# Patient Record
Sex: MALE | Race: WHITE | Employment: STUDENT | URBAN - METROPOLITAN AREA
[De-identification: names, ages, dates, MRNs, and addresses within clinical notes are randomized per-mention and may not be internally consistent; named-entity substitution may affect disease eponyms.]

---
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
## 2019-05-08 ENCOUNTER — APPOINTMENT (OUTPATIENT)
Dept: GENERAL RADIOLOGY | Age: 19
End: 2019-05-08
Attending: NURSE PRACTITIONER
Payer: SELF-PAY

## 2019-05-08 ENCOUNTER — HOSPITAL ENCOUNTER (EMERGENCY)
Age: 19
Discharge: HOME | End: 2019-05-08
Attending: EMERGENCY MEDICINE
Payer: SELF-PAY

## 2019-05-08 VITALS
HEIGHT: 73 IN | BODY MASS INDEX: 29.16 KG/M2 | WEIGHT: 220 LBS | SYSTOLIC BLOOD PRESSURE: 129 MMHG | DIASTOLIC BLOOD PRESSURE: 78 MMHG | HEART RATE: 72 BPM | OXYGEN SATURATION: 98 % | TEMPERATURE: 97.9 F | RESPIRATION RATE: 18 BRPM

## 2019-05-08 DIAGNOSIS — S61.512A LACERATION OF LEFT WRIST, INITIAL ENCOUNTER: Primary | ICD-10-CM

## 2019-05-08 PROCEDURE — 90715 TDAP VACCINE 7 YRS/> IM: CPT | Performed by: NURSE PRACTITIONER

## 2019-05-08 PROCEDURE — 74011250636 HC RX REV CODE- 250/636

## 2019-05-08 PROCEDURE — 74011250636 HC RX REV CODE- 250/636: Performed by: NURSE PRACTITIONER

## 2019-05-08 PROCEDURE — 74011250637 HC RX REV CODE- 250/637: Performed by: NURSE PRACTITIONER

## 2019-05-08 PROCEDURE — 77030002916 HC SUT ETHLN J&J -A: Performed by: NURSE PRACTITIONER

## 2019-05-08 PROCEDURE — 99283 EMERGENCY DEPT VISIT LOW MDM: CPT | Performed by: NURSE PRACTITIONER

## 2019-05-08 PROCEDURE — 75810000293 HC SIMP/SUPERF WND  RPR: Performed by: NURSE PRACTITIONER

## 2019-05-08 PROCEDURE — 73110 X-RAY EXAM OF WRIST: CPT

## 2019-05-08 PROCEDURE — 90471 IMMUNIZATION ADMIN: CPT | Performed by: NURSE PRACTITIONER

## 2019-05-08 RX ORDER — LIDOCAINE HYDROCHLORIDE 10 MG/ML
INJECTION INFILTRATION; PERINEURAL
Status: COMPLETED
Start: 2019-05-08 | End: 2019-05-08

## 2019-05-08 RX ORDER — LIDOCAINE HYDROCHLORIDE 10 MG/ML
10 INJECTION INFILTRATION; PERINEURAL
Status: COMPLETED | OUTPATIENT
Start: 2019-05-08 | End: 2019-05-08

## 2019-05-08 RX ORDER — IBUPROFEN 800 MG/1
800 TABLET ORAL
Status: COMPLETED | OUTPATIENT
Start: 2019-05-08 | End: 2019-05-08

## 2019-05-08 RX ORDER — CEPHALEXIN 500 MG/1
500 CAPSULE ORAL 3 TIMES DAILY
Qty: 15 CAP | Refills: 0 | Status: SHIPPED | OUTPATIENT
Start: 2019-05-08 | End: 2019-05-13

## 2019-05-08 RX ADMIN — IBUPROFEN 800 MG: 800 TABLET, FILM COATED ORAL at 13:40

## 2019-05-08 RX ADMIN — LIDOCAINE HYDROCHLORIDE 10 ML: 10 INJECTION INFILTRATION; PERINEURAL at 13:00

## 2019-05-08 RX ADMIN — TETANUS TOXOID, REDUCED DIPHTHERIA TOXOID AND ACELLULAR PERTUSSIS VACCINE, ADSORBED 0.5 ML: 5; 2.5; 8; 8; 2.5 SUSPENSION INTRAMUSCULAR at 13:40

## 2019-05-08 RX ADMIN — LIDOCAINE HYDROCHLORIDE 10 ML: 10 INJECTION, SOLUTION INFILTRATION; PERINEURAL at 13:00

## 2019-05-08 NOTE — DISCHARGE INSTRUCTIONS
Keflex as prescribed  Antibiotic ointment to the area. Sutures out in 7 days. Return sooner for redness, drainage, fever, or any concerns you may have.

## 2019-05-08 NOTE — ED TRIAGE NOTES
Pt to ed with c/o laceration to the left wrist area - pt reports that his son was playing against a window trying to get a cat and he heard it crack and moved his child and the glass cut him - bleeding is controlled - dressing in place

## 2019-05-08 NOTE — ED NOTES
I have reviewed discharge instructions with the patient. The patient verbalized understanding. Patient left ED via Discharge Method: ambulatory to Home with (insert name of family/friend, self). Opportunity for questions and clarification provided. Patient given 1 scripts. To continue your aftercare when you leave the hospital, you may receive an automated call from our care team to check in on how you are doing. This is a free service and part of our promise to provide the best care and service to meet your aftercare needs.  If you have questions, or wish to unsubscribe from this service please call [phone]. Thank you for Choosing our [facility] Emergency Department.

## 2023-01-04 NOTE — ED PROVIDER NOTES
Patient presents with laceration to his left hand/wrist area. He states he cut his hand on a broken window. He states unknown last tetanus. He denies numbness and tingling to his fingers. The history is provided by the patient. Laceration The incident occurred 1 to 2 hours ago. The laceration is located on the left hand. The laceration is 8 cm in size. The injury mechanism is broken glass. Foreign body present: no. The pain is at a severity of 7/10. The pain is mild. The pain has been constant since onset. Pertinent negatives include no numbness, no tingling, no weakness, no coolness and no discoloration. It is unknown when the patient last had a tetanus shot. Past Medical History:  
Diagnosis Date  Hypertension History reviewed. No pertinent surgical history. Family History:  
Problem Relation Age of Onset  Thyroid Disease Mother   
     hyperthyroid Social History Socioeconomic History  Marital status: SINGLE Spouse name: Not on file  Number of children: Not on file  Years of education: Not on file  Highest education level: Not on file Occupational History  Not on file Social Needs  Financial resource strain: Not on file  Food insecurity:  
  Worry: Not on file Inability: Not on file  Transportation needs:  
  Medical: Not on file Non-medical: Not on file Tobacco Use  Smoking status: Never Smoker Substance and Sexual Activity  Alcohol use: No  
 Drug use: No  
 Sexual activity: Never Lifestyle  Physical activity:  
  Days per week: Not on file Minutes per session: Not on file  Stress: Not on file Relationships  Social connections:  
  Talks on phone: Not on file Gets together: Not on file Attends Latter-day service: Not on file Active member of club or organization: Not on file Attends meetings of clubs or organizations: Not on file Relationship status: Not on file  Intimate partner violence:  
  Fear of current or ex partner: Not on file Emotionally abused: Not on file Physically abused: Not on file Forced sexual activity: Not on file Other Topics Concern  Not on file Social History Narrative  Not on file ALLERGIES: Patient has no known allergies. Review of Systems Constitutional: Negative for chills and fever. Respiratory: Negative for cough and shortness of breath. Cardiovascular: Negative for chest pain. Gastrointestinal: Negative for abdominal pain, constipation, diarrhea, nausea and vomiting. Musculoskeletal: Negative for arthralgias and myalgias. Skin: Positive for wound. Neurological: Negative for tingling, weakness and numbness. Vitals:  
 05/08/19 1215 05/08/19 1244 05/08/19 1357 BP: 132/67  129/78 Pulse: 66  72 Resp: 18  18 Temp: 97.9 °F (36.6 °C) SpO2: 99% 99% 98% Weight: 99.8 kg (220 lb) Height: 6' 1\" (1.854 m) Physical Exam  
Constitutional: He is oriented to person, place, and time. He appears well-developed and well-nourished. No distress. HENT:  
Head: Normocephalic and atraumatic. Eyes: Conjunctivae and EOM are normal.  
Neck: Normal range of motion. Neck supple. Cardiovascular: Normal rate and regular rhythm. Pulmonary/Chest: Effort normal and breath sounds normal.  
Musculoskeletal:  
     Left hand: He exhibits laceration. He exhibits normal range of motion, no tenderness and normal two-point discrimination. Normal sensation noted. Normal strength noted. Hands: 
Neurological: He is alert and oriented to person, place, and time. Skin: Skin is warm and dry. Laceration noted. He is not diaphoretic. Psychiatric: He has a normal mood and affect. His behavior is normal.  
Nursing note and vitals reviewed. Xr Wrist Lt Ap/lat/obl Min 3v Result Date: 5/8/2019 Left wrist radiographs 5/8/2019.  Clinical History: Window broke and he got cut with left hand laceration and wrist pain. Findings: Three views of the left wrist show normal alignment of the visualized osseous structures. No acute fractures are seen. The location of the patient's laceration is not indicated. No abnormal radiopaque soft tissue foreign body is seen. Rounded densities are seen anterior to multiple metacarpophalangeal joints which are felt to represent normal sesamoid bones. Impression: 1. No acute osseous abnormality of the left wrist evident by plain film imaging. 2. No abnormal radiopaque soft tissue foreign body. MDM Number of Diagnoses or Management Options Laceration of left wrist, initial encounter: new and requires workup Diagnosis management comments: Wound repaired. Xray negative for acute changes and foreign body. Tetanus shot given. Amount and/or Complexity of Data Reviewed Tests in the radiology section of CPT®: ordered and reviewed Tests in the medicine section of CPT®: ordered Risk of Complications, Morbidity, and/or Mortality Presenting problems: moderate Diagnostic procedures: low Management options: low Patient Progress Patient progress: stable Wound Repair 
Date/Time: 5/8/2019 3:29 PM 
Performed by: Laureen Matos provider: Marlyne Favre Preparation: skin prepped with Betadine Pre-procedure re-eval: Immediately prior to the procedure, the patient was reevaluated and found suitable for the planned procedure and any planned medications. Time out: Immediately prior to the procedure a time out was called to verify the correct patient, procedure, equipment, staff and marking as appropriate. Joe Manuel Location details: left hand Wound length:7.6 - 12.5 cm Anesthesia: local infiltration Anesthesia: 
Local Anesthetic: lidocaine 1% without epinephrine Anesthetic total: 3 mL Foreign bodies: no foreign bodies Irrigation solution: saline Irrigation method: syringe Debridement: none Skin closure: 4-0 nylon Number of sutures: 11 
 Technique: simple Approximation: close Dressing: antibiotic ointment and non-adhesive packing strip Patient tolerance: Patient tolerated the procedure well with no immediate complications My total time at bedside, performing this procedure was 1-15 minutes. 1

## 2023-10-18 ENCOUNTER — NURSE TRIAGE (OUTPATIENT)
Dept: OTHER | Facility: CLINIC | Age: 23
End: 2023-10-18

## 2023-10-18 NOTE — TELEPHONE ENCOUNTER
Location of patient: [place]    Received call from [facility] at [facility]; Patient with Red Flag Complaint requesting to establish care with a provider in [place]. Subjective: Caller states \"Severe headaches with vomiting\"     Pt not present at time of call, triage limited. Current Symptoms:   Headaches with vomiting  Requires lying in bed and unable to do normal activities when headache is present. No headache today, currently at work. Denies numbness or weakness, SOB, changes to speech of vision. Hx chronic migraines, has not been seen for these recently and is not on medication regime    Onset: 2-3 episodes per month, lasts anywhere from hours to 3-4 days. Has had migraines for many years per caller. Associated Symptoms:  Misses work due to headaches, reduced activity , wife states it is effecting their family life. Eating/drinking normally    Pain Severity: Severe headaches intermittently. Pain in lower back     Temperature: Denies    What has been tried: Midol    Recommended disposition: See in Office Within 2 Weeks. As pt is unestablished, recommended UCC. Care advice provided, patient verbalizes understanding; denies any other questions or concerns; instructed to call back for any new or worsening symptoms. Patient/Caller agrees with recommended disposition; writer provided warm transfer to [employer] at [facility] for appointment scheduling to establish care. Attention Provider: Thank you for allowing me to participate in the care of your patient. The patient was connected to triage in response to information provided to the [facility]. Please do not respond through this encounter as the response is not directed to a shared pool.   Reason for Disposition   Headache is a chronic symptom (recurrent or ongoing AND lasting > 4 weeks)    Protocols used: Headache-ADULT-[place]

## 2023-11-20 ENCOUNTER — OFFICE VISIT (OUTPATIENT)
Dept: FAMILY MEDICINE CLINIC | Facility: CLINIC | Age: 23
End: 2023-11-20
Payer: COMMERCIAL

## 2023-11-20 VITALS
WEIGHT: 215.2 LBS | SYSTOLIC BLOOD PRESSURE: 134 MMHG | DIASTOLIC BLOOD PRESSURE: 76 MMHG | BODY MASS INDEX: 26.76 KG/M2 | OXYGEN SATURATION: 98 % | HEIGHT: 75 IN | HEART RATE: 66 BPM

## 2023-11-20 DIAGNOSIS — Z00.00 HEALTHCARE MAINTENANCE: ICD-10-CM

## 2023-11-20 DIAGNOSIS — J30.1 SEASONAL ALLERGIC RHINITIS DUE TO POLLEN: Primary | ICD-10-CM

## 2023-11-20 DIAGNOSIS — R11.10 CHRONIC VOMITING: ICD-10-CM

## 2023-11-20 DIAGNOSIS — M20.42 HAMMER TOE OF LEFT FOOT: ICD-10-CM

## 2023-11-20 DIAGNOSIS — G89.29 CHRONIC MIDLINE LOW BACK PAIN WITHOUT SCIATICA: ICD-10-CM

## 2023-11-20 DIAGNOSIS — M54.50 CHRONIC MIDLINE LOW BACK PAIN WITHOUT SCIATICA: ICD-10-CM

## 2023-11-20 DIAGNOSIS — G43.709 CHRONIC MIGRAINE W/O AURA W/O STATUS MIGRAINOSUS, NOT INTRACTABLE: ICD-10-CM

## 2023-11-20 LAB
ALBUMIN SERPL-MCNC: 4.5 G/DL (ref 3.5–5)
ALBUMIN/GLOB SERPL: 1.4 (ref 0.4–1.6)
ALP SERPL-CCNC: 57 U/L (ref 50–136)
ALT SERPL-CCNC: 55 U/L (ref 12–65)
ANION GAP SERPL CALC-SCNC: 4 MMOL/L (ref 2–11)
AST SERPL-CCNC: 26 U/L (ref 15–37)
BASOPHILS # BLD: 0 K/UL (ref 0–0.2)
BASOPHILS NFR BLD: 1 % (ref 0–2)
BILIRUB SERPL-MCNC: 0.5 MG/DL (ref 0.2–1.1)
BUN SERPL-MCNC: 19 MG/DL (ref 6–23)
CALCIUM SERPL-MCNC: 10.2 MG/DL (ref 8.3–10.4)
CHLORIDE SERPL-SCNC: 109 MMOL/L (ref 101–110)
CHOLEST SERPL-MCNC: 225 MG/DL
CO2 SERPL-SCNC: 29 MMOL/L (ref 21–32)
CREAT SERPL-MCNC: 1.1 MG/DL (ref 0.8–1.5)
DIFFERENTIAL METHOD BLD: NORMAL
EOSINOPHIL # BLD: 0.2 K/UL (ref 0–0.8)
EOSINOPHIL NFR BLD: 3 % (ref 0.5–7.8)
ERYTHROCYTE [DISTWIDTH] IN BLOOD BY AUTOMATED COUNT: 13.1 % (ref 11.9–14.6)
GLOBULIN SER CALC-MCNC: 3.3 G/DL (ref 2.8–4.5)
GLUCOSE SERPL-MCNC: 52 MG/DL (ref 65–100)
HCT VFR BLD AUTO: 48.7 % (ref 41.1–50.3)
HCV AB SER QL: NONREACTIVE
HDLC SERPL-MCNC: 39 MG/DL (ref 40–60)
HDLC SERPL: 5.8
HGB BLD-MCNC: 16.4 G/DL (ref 13.6–17.2)
HIV 1+2 AB+HIV1 P24 AG SERPL QL IA: NONREACTIVE
HIV 1/2 RESULT COMMENT: NORMAL
IMM GRANULOCYTES # BLD AUTO: 0 K/UL (ref 0–0.5)
IMM GRANULOCYTES NFR BLD AUTO: 0 % (ref 0–5)
LDLC SERPL CALC-MCNC: 153.2 MG/DL
LYMPHOCYTES # BLD: 2.3 K/UL (ref 0.5–4.6)
LYMPHOCYTES NFR BLD: 36 % (ref 13–44)
MCH RBC QN AUTO: 30.1 PG (ref 26.1–32.9)
MCHC RBC AUTO-ENTMCNC: 33.7 G/DL (ref 31.4–35)
MCV RBC AUTO: 89.5 FL (ref 82–102)
MONOCYTES # BLD: 0.6 K/UL (ref 0.1–1.3)
MONOCYTES NFR BLD: 10 % (ref 4–12)
NEUTS SEG # BLD: 3.2 K/UL (ref 1.7–8.2)
NEUTS SEG NFR BLD: 50 % (ref 43–78)
NRBC # BLD: 0 K/UL (ref 0–0.2)
PLATELET # BLD AUTO: 210 K/UL (ref 150–450)
PMV BLD AUTO: 9.9 FL (ref 9.4–12.3)
POTASSIUM SERPL-SCNC: 4.5 MMOL/L (ref 3.5–5.1)
PROT SERPL-MCNC: 7.8 G/DL (ref 6.3–8.2)
RBC # BLD AUTO: 5.44 M/UL (ref 4.23–5.6)
SODIUM SERPL-SCNC: 142 MMOL/L (ref 133–143)
TRIGL SERPL-MCNC: 164 MG/DL (ref 35–150)
VLDLC SERPL CALC-MCNC: 32.8 MG/DL (ref 6–23)
WBC # BLD AUTO: 6.3 K/UL (ref 4.3–11.1)

## 2023-11-20 PROCEDURE — 99204 OFFICE O/P NEW MOD 45 MIN: CPT | Performed by: FAMILY MEDICINE

## 2023-11-20 PROCEDURE — 3078F DIAST BP <80 MM HG: CPT | Performed by: FAMILY MEDICINE

## 2023-11-20 PROCEDURE — 3075F SYST BP GE 130 - 139MM HG: CPT | Performed by: FAMILY MEDICINE

## 2023-11-20 RX ORDER — TOPIRAMATE 25 MG/1
TABLET ORAL
Qty: 150 TABLET | Refills: 0 | Status: SHIPPED | OUTPATIENT
Start: 2023-11-20 | End: 2023-11-22 | Stop reason: SDUPTHER

## 2023-11-20 RX ORDER — MONTELUKAST SODIUM 10 MG/1
10 TABLET ORAL DAILY
Qty: 30 TABLET | Refills: 3 | Status: SHIPPED | OUTPATIENT
Start: 2023-11-20 | End: 2023-11-22 | Stop reason: SDUPTHER

## 2023-11-20 RX ORDER — TOPIRAMATE 100 MG/1
100 TABLET, FILM COATED ORAL DAILY
Qty: 90 TABLET | Refills: 1 | Status: SHIPPED | OUTPATIENT
Start: 2024-01-01 | End: 2023-11-22 | Stop reason: SDUPTHER

## 2023-11-20 SDOH — ECONOMIC STABILITY: HOUSING INSECURITY
IN THE LAST 12 MONTHS, WAS THERE A TIME WHEN YOU DID NOT HAVE A STEADY PLACE TO SLEEP OR SLEPT IN A SHELTER (INCLUDING NOW)?: NO

## 2023-11-20 SDOH — ECONOMIC STABILITY: FOOD INSECURITY: WITHIN THE PAST 12 MONTHS, THE FOOD YOU BOUGHT JUST DIDN'T LAST AND YOU DIDN'T HAVE MONEY TO GET MORE.: NEVER TRUE

## 2023-11-20 SDOH — ECONOMIC STABILITY: FOOD INSECURITY: WITHIN THE PAST 12 MONTHS, YOU WORRIED THAT YOUR FOOD WOULD RUN OUT BEFORE YOU GOT MONEY TO BUY MORE.: NEVER TRUE

## 2023-11-20 SDOH — ECONOMIC STABILITY: INCOME INSECURITY: HOW HARD IS IT FOR YOU TO PAY FOR THE VERY BASICS LIKE FOOD, HOUSING, MEDICAL CARE, AND HEATING?: NOT HARD AT ALL

## 2023-11-20 ASSESSMENT — ENCOUNTER SYMPTOMS
NAUSEA: 1
BLURRED VISION: 1
BACK PAIN: 1
VOMITING: 1

## 2023-11-20 ASSESSMENT — PATIENT HEALTH QUESTIONNAIRE - PHQ9
SUM OF ALL RESPONSES TO PHQ QUESTIONS 1-9: 0
SUM OF ALL RESPONSES TO PHQ9 QUESTIONS 1 & 2: 0
2. FEELING DOWN, DEPRESSED OR HOPELESS: 0
SUM OF ALL RESPONSES TO PHQ QUESTIONS 1-9: 0
1. LITTLE INTEREST OR PLEASURE IN DOING THINGS: 0
SUM OF ALL RESPONSES TO PHQ QUESTIONS 1-9: 0
SUM OF ALL RESPONSES TO PHQ QUESTIONS 1-9: 0

## 2023-11-20 NOTE — PROGRESS NOTES
PROGRESS NOTE    SUBJECTIVE:   Mateus Berg is a 20 y.o. male seen for a new patient visit regarding   Chief Complaint   Patient presents with    New Patient     [facility] [place] [facility]   Complains of headaches 2-3 times a week. Reports being diagnosed with chronic migraines as a child. Reports having some toes on his left foot that goes inwards; swelling and pain daily  Complains of mid line back pain for a few years; denies any injury besides having left foot problems  Reports throwing up every morning for the past 10 years        HPI:  Mr. Susan Smart reports he was diagnosed with chronic migraines as a child, he was given a medication to treat his migraines but cannot remember the name. He has headaches several times a week. He describes the pain as a 10/10. The patient also mentions that he vomits upon waking up every morning. He wakes up and goes straight to the toilet to vomit. No inciting event noted. He occasionally will have another episode of vomiting later in the day but not always. He also complains mid low back pain, he feels like there is a knot in his back. The pain does not radiate. 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 Additionally he has a hammertoe, third  toe, on his left foot that becomes painfully swollen at the end of every day. He had this looked at when he was a child but his parents never followed through with the workup. Lastly, over the past 2 years he has developed seasonal allergies including nasal congestion, runny nose, and headache. He inquires about prescription allergy medicine today. Migraine   This is a recurrent problem. The current episode started more than 1 year ago. The problem occurs daily. The pain is located in the Frontal and temporal region. The pain quality is similar to prior headaches. The quality of the pain is described as aching and throbbing. The pain is at a severity of 10/10. The pain is severe.  Associated symptoms include back

## 2023-11-22 DIAGNOSIS — G43.709 CHRONIC MIGRAINE W/O AURA W/O STATUS MIGRAINOSUS, NOT INTRACTABLE: Primary | ICD-10-CM

## 2023-11-22 DIAGNOSIS — J30.1 SEASONAL ALLERGIC RHINITIS DUE TO POLLEN: ICD-10-CM

## 2023-11-22 RX ORDER — TOPIRAMATE 25 MG/1
TABLET ORAL
Qty: 150 TABLET | Refills: 0 | Status: SHIPPED | OUTPATIENT
Start: 2023-11-22 | End: 2024-01-03

## 2023-11-22 RX ORDER — MONTELUKAST SODIUM 10 MG/1
10 TABLET ORAL DAILY
Qty: 30 TABLET | Refills: 3 | Status: SHIPPED | OUTPATIENT
Start: 2023-11-22

## 2023-11-22 RX ORDER — TOPIRAMATE 100 MG/1
100 TABLET, FILM COATED ORAL DAILY
Qty: 90 TABLET | Refills: 1 | Status: SHIPPED | OUTPATIENT
Start: 2024-01-01 | End: 2024-06-29

## 2023-12-01 ENCOUNTER — OFFICE VISIT (OUTPATIENT)
Dept: ORTHOPEDIC SURGERY | Age: 23
End: 2023-12-01
Payer: COMMERCIAL

## 2023-12-01 DIAGNOSIS — M79.672 LEFT FOOT PAIN: Primary | ICD-10-CM

## 2023-12-01 DIAGNOSIS — M20.12 HALLUX VALGUS OF LEFT FOOT: ICD-10-CM

## 2023-12-01 DIAGNOSIS — M20.42 HAMMER TOE OF LEFT FOOT: ICD-10-CM

## 2023-12-01 PROCEDURE — 99204 OFFICE O/P NEW MOD 45 MIN: CPT | Performed by: ORTHOPAEDIC SURGERY

## 2023-12-01 NOTE — PROGRESS NOTES
Name: Gerald Cummings  YOB: 2000  Gender: male  MRN: [id number]    Summary:   Left hallux valgus and second and third fixed hammertoe deformities       CC: New Patient (Left foot xrays obtained in office today. )       HPI: Gerald Cummings is a 20 y.o. male who presents with New Patient (Left foot xrays obtained in office today. )  . This patient presents the office today with longstanding history of worsening left forefoot pain and deformity. He is tried pads and spacers and has pain with all of his shoes now. History was obtained by Patient his wife    ROS/Meds/PSH/PMH/FH/SH: I personally reviewed the patients standard intake form. Below are the pertinents    Tobacco:  reports that he quit smoking about 22 months ago. His smoking use included cigarettes. He has a 16.00 pack-year smoking history. He has never used smokeless tobacco.  Diabetes: None      Physical Examination:    Exam of the left foot and ankle shows a hallux valgus deformity as well as fixed second and third hammertoe deformities. He has palpable pulses and intact sensation. He has flexible fourth and fifth toes. Imaging:   Interpretation of imaging  Left foot XR: AP, Lateral, Oblique views     ICD-10-CM    1. Left foot pain  M79.672 XR FOOT LEFT (MIN 3 VIEWS)      2. Hammer toe of left foot  M20.42       3.  Hallux valgus of left foot  M20.12          Report: AP, lateral, oblique x-ray of the left foot demonstrates hallux valgus with claw toes    Impression: Valgus with claw toes   Joe Payton III, MD           Assessment:   Hallux valgus, left second and third hammertoe deformities    Treatment Plan:   4 This is a chronic illness/condition with exacerbation and progression  Treatment at this time: Elective major surgery with procedural risk factors  Studies ordered: NO XR needed @ Next Visit    Weight-bearing status: WBAT        Return to work/work restrictions: none  No medications given        Left minimal invasive

## 2023-12-06 ENCOUNTER — TELEPHONE (OUTPATIENT)
Dept: FAMILY MEDICINE CLINIC | Facility: CLINIC | Age: 23
End: 2023-12-06

## 2023-12-06 ENCOUNTER — OFFICE VISIT (OUTPATIENT)
Dept: FAMILY MEDICINE CLINIC | Facility: CLINIC | Age: 23
End: 2023-12-06
Payer: COMMERCIAL

## 2023-12-06 VITALS
DIASTOLIC BLOOD PRESSURE: 72 MMHG | HEART RATE: 84 BPM | WEIGHT: 215 LBS | BODY MASS INDEX: 29.12 KG/M2 | TEMPERATURE: 98.4 F | OXYGEN SATURATION: 98 % | HEIGHT: 72 IN | SYSTOLIC BLOOD PRESSURE: 114 MMHG

## 2023-12-06 DIAGNOSIS — J10.1 INFLUENZA B: Primary | ICD-10-CM

## 2023-12-06 DIAGNOSIS — R05.1 ACUTE COUGH: ICD-10-CM

## 2023-12-06 DIAGNOSIS — R52 BODY ACHES: ICD-10-CM

## 2023-12-06 LAB
EXP DATE SOLUTION: NORMAL
EXP DATE SWAB: NORMAL
EXPIRATION DATE: NORMAL
INFLUENZA A ANTIGEN, POC: NEGATIVE
INFLUENZA B ANTIGEN, POC: POSITIVE
LOT NUMBER POC: NORMAL
LOT NUMBER SOLUTION: NORMAL
LOT NUMBER SWAB: NORMAL
SARS-COV-2 RNA, POC: NEGATIVE
VALID INTERNAL CONTROL, POC: ABNORMAL

## 2023-12-06 PROCEDURE — 99213 OFFICE O/P EST LOW 20 MIN: CPT | Performed by: FAMILY MEDICINE

## 2023-12-06 PROCEDURE — 3074F SYST BP LT 130 MM HG: CPT | Performed by: FAMILY MEDICINE

## 2023-12-06 PROCEDURE — 87502 INFLUENZA DNA AMP PROBE: CPT | Performed by: FAMILY MEDICINE

## 2023-12-06 PROCEDURE — 3078F DIAST BP <80 MM HG: CPT | Performed by: FAMILY MEDICINE

## 2023-12-06 PROCEDURE — 87635 SARS-COV-2 COVID-19 AMP PRB: CPT | Performed by: FAMILY MEDICINE

## 2023-12-06 RX ORDER — OSELTAMIVIR PHOSPHATE 75 MG/1
75 CAPSULE ORAL 2 TIMES DAILY
Qty: 10 CAPSULE | Refills: 0 | Status: SHIPPED | OUTPATIENT
Start: 2023-12-06 | End: 2023-12-11

## 2023-12-06 ASSESSMENT — ENCOUNTER SYMPTOMS
SINUS PRESSURE: 0
SINUS PAIN: 1
COUGH: 1
SHORTNESS OF BREATH: 0
GASTROINTESTINAL NEGATIVE: 1

## 2023-12-06 NOTE — TELEPHONE ENCOUNTER
Patient is needing the Baloxavir Marboxil (XOFLUZA, 80 MG DOSE,) 1 x 80 MG tablet  sent to [facility] due to [facility] is out

## 2023-12-06 NOTE — TELEPHONE ENCOUNTER
[facility] states medication is not covered by insurance. Requesting Rx to be changed to Tamiflu. Please advise.

## 2024-01-07 DIAGNOSIS — M20.12 HALLUX VALGUS OF LEFT FOOT: ICD-10-CM

## 2024-01-07 DIAGNOSIS — M20.42 HAMMER TOE OF LEFT FOOT: Primary | ICD-10-CM

## 2024-01-08 PROBLEM — M20.42 HAMMER TOE OF LEFT FOOT: Status: ACTIVE | Noted: 2024-01-07

## 2024-01-08 PROBLEM — M20.12 HALLUX VALGUS OF LEFT FOOT: Status: ACTIVE | Noted: 2024-01-07

## 2024-01-22 ENCOUNTER — TELEPHONE (OUTPATIENT)
Dept: FAMILY MEDICINE CLINIC | Facility: CLINIC | Age: 24
End: 2024-01-22